# Patient Record
Sex: MALE | Race: BLACK OR AFRICAN AMERICAN | NOT HISPANIC OR LATINO | Employment: FULL TIME | URBAN - METROPOLITAN AREA
[De-identification: names, ages, dates, MRNs, and addresses within clinical notes are randomized per-mention and may not be internally consistent; named-entity substitution may affect disease eponyms.]

---

## 2020-07-19 ENCOUNTER — HOSPITAL ENCOUNTER (EMERGENCY)
Facility: HOSPITAL | Age: 60
Discharge: HOME | End: 2020-07-19
Attending: EMERGENCY MEDICINE | Admitting: EMERGENCY MEDICINE
Payer: COMMERCIAL

## 2020-07-19 VITALS
RESPIRATION RATE: 18 BRPM | HEART RATE: 54 BPM | WEIGHT: 258 LBS | OXYGEN SATURATION: 97 % | DIASTOLIC BLOOD PRESSURE: 84 MMHG | HEIGHT: 74 IN | SYSTOLIC BLOOD PRESSURE: 154 MMHG | TEMPERATURE: 97.6 F | BODY MASS INDEX: 33.11 KG/M2

## 2020-07-19 DIAGNOSIS — I10 HYPERTENSION: Primary | ICD-10-CM

## 2020-07-19 LAB
ALBUMIN SERPL BCP-MCNC: 3.6 G/DL (ref 3.5–5)
ALP SERPL-CCNC: 84 U/L (ref 46–116)
ALT SERPL W P-5'-P-CCNC: 34 U/L (ref 12–78)
ANION GAP SERPL CALCULATED.3IONS-SCNC: 7 MMOL/L (ref 4–13)
AST SERPL W P-5'-P-CCNC: 23 U/L (ref 5–45)
ATRIAL RATE: 54 BPM
ATRIAL RATE: 58 BPM
BASOPHILS # BLD AUTO: 0.04 THOUSANDS/ΜL (ref 0–0.1)
BASOPHILS NFR BLD AUTO: 1 % (ref 0–1)
BILIRUB DIRECT SERPL-MCNC: 0.23 MG/DL (ref 0–0.2)
BILIRUB SERPL-MCNC: 1.1 MG/DL (ref 0.2–1)
BUN SERPL-MCNC: 16 MG/DL (ref 5–25)
CALCIUM SERPL-MCNC: 8.8 MG/DL (ref 8.3–10.1)
CHLORIDE SERPL-SCNC: 103 MMOL/L (ref 100–108)
CO2 SERPL-SCNC: 27 MMOL/L (ref 21–32)
CREAT SERPL-MCNC: 1.2 MG/DL (ref 0.6–1.3)
EOSINOPHIL # BLD AUTO: 0.3 THOUSAND/ΜL (ref 0–0.61)
EOSINOPHIL NFR BLD AUTO: 4 % (ref 0–6)
ERYTHROCYTE [DISTWIDTH] IN BLOOD BY AUTOMATED COUNT: 13 % (ref 11.6–15.1)
GFR SERPL CREATININE-BSD FRML MDRD: 77 ML/MIN/1.73SQ M
GLUCOSE SERPL-MCNC: 100 MG/DL (ref 65–140)
HCT VFR BLD AUTO: 48.1 % (ref 36.5–49.3)
HGB BLD-MCNC: 15.3 G/DL (ref 12–17)
IMM GRANULOCYTES # BLD AUTO: 0.01 THOUSAND/UL (ref 0–0.2)
IMM GRANULOCYTES NFR BLD AUTO: 0 % (ref 0–2)
LYMPHOCYTES # BLD AUTO: 2.52 THOUSANDS/ΜL (ref 0.6–4.47)
LYMPHOCYTES NFR BLD AUTO: 36 % (ref 14–44)
MCH RBC QN AUTO: 28.5 PG (ref 26.8–34.3)
MCHC RBC AUTO-ENTMCNC: 31.8 G/DL (ref 31.4–37.4)
MCV RBC AUTO: 90 FL (ref 82–98)
MONOCYTES # BLD AUTO: 0.62 THOUSAND/ΜL (ref 0.17–1.22)
MONOCYTES NFR BLD AUTO: 9 % (ref 4–12)
NEUTROPHILS # BLD AUTO: 3.43 THOUSANDS/ΜL (ref 1.85–7.62)
NEUTS SEG NFR BLD AUTO: 50 % (ref 43–75)
NRBC BLD AUTO-RTO: 0 /100 WBCS
P AXIS: 52 DEGREES
P AXIS: 60 DEGREES
PLATELET # BLD AUTO: 187 THOUSANDS/UL (ref 149–390)
PMV BLD AUTO: 12.3 FL (ref 8.9–12.7)
POTASSIUM SERPL-SCNC: 3.5 MMOL/L (ref 3.5–5.3)
PR INTERVAL: 162 MS
PR INTERVAL: 162 MS
PROT SERPL-MCNC: 7.5 G/DL (ref 6.4–8.2)
QRS AXIS: -41 DEGREES
QRS AXIS: -44 DEGREES
QRSD INTERVAL: 108 MS
QRSD INTERVAL: 112 MS
QT INTERVAL: 390 MS
QT INTERVAL: 416 MS
QTC INTERVAL: 382 MS
QTC INTERVAL: 394 MS
RBC # BLD AUTO: 5.36 MILLION/UL (ref 3.88–5.62)
SARS-COV-2 RNA RESP QL NAA+PROBE: NEGATIVE
SODIUM SERPL-SCNC: 137 MMOL/L (ref 136–145)
T WAVE AXIS: 19 DEGREES
T WAVE AXIS: 42 DEGREES
TROPONIN I SERPL-MCNC: 0.04 NG/ML
TROPONIN I SERPL-MCNC: 0.04 NG/ML
VENTRICULAR RATE: 54 BPM
VENTRICULAR RATE: 58 BPM
WBC # BLD AUTO: 6.92 THOUSAND/UL (ref 4.31–10.16)

## 2020-07-19 PROCEDURE — 99285 EMERGENCY DEPT VISIT HI MDM: CPT | Performed by: PHYSICIAN ASSISTANT

## 2020-07-19 PROCEDURE — 80076 HEPATIC FUNCTION PANEL: CPT | Performed by: PHYSICIAN ASSISTANT

## 2020-07-19 PROCEDURE — 80048 BASIC METABOLIC PNL TOTAL CA: CPT | Performed by: PHYSICIAN ASSISTANT

## 2020-07-19 PROCEDURE — 96361 HYDRATE IV INFUSION ADD-ON: CPT

## 2020-07-19 PROCEDURE — 84484 ASSAY OF TROPONIN QUANT: CPT | Performed by: PHYSICIAN ASSISTANT

## 2020-07-19 PROCEDURE — 96360 HYDRATION IV INFUSION INIT: CPT

## 2020-07-19 PROCEDURE — 93010 ELECTROCARDIOGRAM REPORT: CPT | Performed by: INTERNAL MEDICINE

## 2020-07-19 PROCEDURE — 87635 SARS-COV-2 COVID-19 AMP PRB: CPT | Performed by: PHYSICIAN ASSISTANT

## 2020-07-19 PROCEDURE — 93005 ELECTROCARDIOGRAM TRACING: CPT

## 2020-07-19 PROCEDURE — 36415 COLL VENOUS BLD VENIPUNCTURE: CPT | Performed by: PHYSICIAN ASSISTANT

## 2020-07-19 PROCEDURE — 99284 EMERGENCY DEPT VISIT MOD MDM: CPT

## 2020-07-19 PROCEDURE — 85025 COMPLETE CBC W/AUTO DIFF WBC: CPT | Performed by: PHYSICIAN ASSISTANT

## 2020-07-19 RX ADMIN — SODIUM CHLORIDE 1000 ML: 0.9 INJECTION, SOLUTION INTRAVENOUS at 10:51

## 2020-07-19 NOTE — ED PROVIDER NOTES
History  Chief Complaint   Patient presents with    Hypertension     follows bp at work, went to nurse today due to dizziness and sob, found to be hypertensive      Denita Paris is a 64 y o  male w PMH HTN who presents for evaluation of high blood pressure  Patient gets his blood pressure checked every day at work  He reports yesterday it was elevated at 140 6/104 and he was sent home  He does report feeling very mildly dizzy and very mild shortness of breath  Has no chest pain, tightness, trouble breathing or cough  No fevers or chills  He reports about 10 people at his job of coronavirus at of about 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 employees  He denies nausea, vomiting, diarrhea or constipation  No headaches  No blurred vision  None       Past Medical History:   Diagnosis Date    Hypertension        No past surgical history on file  No family history on file  I have reviewed and agree with the history as documented  E-Cigarette/Vaping     E-Cigarette/Vaping Substances     Social History     Tobacco Use    Smoking status: Not on file   Substance Use Topics    Alcohol use: Not on file    Drug use: Not on file       Review of Systems   Constitutional: Negative for activity change, chills, diaphoresis, fatigue and fever  HENT: Negative for congestion and rhinorrhea  Eyes: Negative for pain  Respiratory: Positive for shortness of breath  Negative for cough, chest tightness and wheezing  Cardiovascular: Negative for chest pain and palpitations  Gastrointestinal: Negative for abdominal distention, constipation, diarrhea, nausea and vomiting  Genitourinary: Negative for difficulty urinating and dysuria  Musculoskeletal: Negative for arthralgias and myalgias  Neurological: Positive for dizziness  Negative for weakness, light-headedness and headaches  Psychiatric/Behavioral: The patient is not nervous/anxious          Physical Exam  Physical Exam   Constitutional: He is oriented to person, place, and time  He appears well-developed and well-nourished  No distress  HENT:   Head: Normocephalic and atraumatic  Eyes: Pupils are equal, round, and reactive to light  Neck: Normal range of motion  Neck supple  No tracheal deviation present  Cardiovascular: Normal rate, regular rhythm, normal heart sounds and intact distal pulses  Exam reveals no gallop and no friction rub  No murmur heard  Pulmonary/Chest: Effort normal and breath sounds normal  No respiratory distress  He has no wheezes  He has no rales  Abdominal: Soft  Bowel sounds are normal  He exhibits no distension and no mass  There is no tenderness  There is no guarding  Musculoskeletal: He exhibits no edema or deformity  Neurological: He is alert and oriented to person, place, and time  GCS 15, nonfocal exam, normal motor and sensory function, clear fluent speech   Skin: Skin is warm and dry  He is not diaphoretic  Psychiatric: He has a normal mood and affect  His behavior is normal    Nursing note and vitals reviewed        Vital Signs  ED Triage Vitals [07/19/20 1034]   Temperature Pulse Respirations Blood Pressure SpO2   97 6 °F (36 4 °C) 62 18 (!) 171/99 96 %      Temp Source Heart Rate Source Patient Position - Orthostatic VS BP Location FiO2 (%)   Oral Monitor Lying Right arm --      Pain Score       --           Vitals:    07/19/20 1034 07/19/20 1130 07/19/20 1342 07/19/20 1345   BP: (!) 171/99 148/91  154/84   Pulse: 62 60 (!) 54    Patient Position - Orthostatic VS: Lying   Sitting         Visual Acuity      ED Medications  Medications   sodium chloride 0 9 % bolus 1,000 mL (0 mL Intravenous Stopped 7/19/20 1331)       Diagnostic Studies  Results Reviewed     Procedure Component Value Units Date/Time    Troponin I [[id number]]  (Normal) Collected:  07/19/20 1345    Lab Status:  Final result Specimen:  Blood from Arm, Right Updated:  07/19/20 1410     Troponin I 0 04 ng/mL     [facility]ovid[facility][id number]] (Normal) Collected:  07/19/20 1047    Lab Status:  Final result Specimen:  Nares from Nose Updated:  07/19/20 1158     SARS-CoV-2 Negative    Narrative: The specimen collection materials, transport medium, and/or testing methodology utilized in the production of these test results have been proven to be reliable in a limited validation with an abbreviated program under the Emergency Utilization Authorization provided by the FDA  Testing reported as "Presumptive positive" will be confirmed with secondary testing with a reference laboratory to ensure result accuracy  Clinical caution and judgement should be used with the interpretation of these results with consideration of the clinical impression and other laboratory testing  Testing reported as "Positive" or "Negative" has been proven to be accurate according to standard laboratory validation requirements  All testing is performed with control materials showing appropriate reactivity at standard intervals        Troponin I [[id number]]  (Normal) Collected:  07/19/20 1047    Lab Status:  Final result Specimen:  Blood from Arm, Left Updated:  07/19/20 1130     Troponin I 0 04 ng/mL     Basic metabolic panel [[id number]] Collected:  07/19/20 1047    Lab Status:  Final result Specimen:  Blood from Arm, Left Updated:  07/19/20 1129     Sodium 137 mmol/L      Potassium 3 5 mmol/L      Chloride 103 mmol/L      CO2 27 mmol/L      ANION GAP 7 mmol/L      BUN 16 mg/dL      Creatinine 1 20 mg/dL      Glucose 100 mg/dL      Calcium 8 8 mg/dL      eGFR 77 ml/min/1 73sq m     Narrative:       [place] guidelines for Chronic Kidney Disease (CKD):     Stage 1 with normal or high GFR (GFR > 90 mL/min/1 73 square meters)    Stage 2 Mild CKD (GFR = 60-89 mL/min/1 73 square meters)    Stage 3A Moderate CKD (GFR = 45-59 mL/min/1 73 square meters)    Stage 3B Moderate CKD (GFR = 30-44 mL/min/1 73 square meters)    Stage 4 Severe CKD (GFR = 15-29 mL/min/1 73 square meters)    Stage 5 End Stage CKD (GFR <15 mL/min/1 73 square meters)  Note: GFR calculation is accurate only with a steady state creatinine    Hepatic function panel [[id number]]  (Abnormal) Collected:  07/19/20 1047    Lab Status:  Final result Specimen:  Blood from Arm, Left Updated:  07/19/20 1129     Total Bilirubin 1 10 mg/dL      Bilirubin, Direct 0 23 mg/dL      Alkaline Phosphatase 84 U/L      AST 23 U/L      ALT 34 U/L      Total Protein 7 5 g/dL      Albumin 3 6 g/dL     CBC and differential [[id number]] Collected:  07/19/20 1047    Lab Status:  Final result Specimen:  Blood from Arm, Left Updated:  07/19/20 1059     WBC 6 92 Thousand/uL      RBC 5 36 Million/uL      Hemoglobin 15 3 g/dL      Hematocrit 48 1 %      MCV 90 fL      MCH 28 5 pg      MCHC 31 8 g/dL      RDW 13 0 %      MPV 12 3 fL      Platelets 120 Thousands/uL      nRBC 0 /100 WBCs      Neutrophils Relative 50 %      Immat GRANS % 0 %      Lymphocytes Relative 36 %      Monocytes Relative 9 %      Eosinophils Relative 4 %      Basophils Relative 1 %      Neutrophils Absolute 3 43 Thousands/µL      Immature Grans Absolute 0 01 Thousand/uL      Lymphocytes Absolute 2 52 Thousands/µL      Monocytes Absolute 0 62 Thousand/µL      Eosinophils Absolute 0 30 Thousand/µL      Basophils Absolute 0 04 Thousands/µL                  No orders to display              Procedures  Procedures         ED Course  ED Course as of Jul 19 1604   Sun Jul 19, 2020   1125 EKG Interpretation    Rate: 58 BPM  Rhythm: sinus dyana  Axis: LAD  Intervals: Normal, no blocks, QTc 382ms  Q waves: no  T waves: flattened in III   ST segments: no dep / elevation    Impression: abnormal EKG  EKG for comparison: no prior   EKG interpreted by me  US AUDIT      Most Recent Value   Initial Alcohol Screen: US AUDIT-C    1  How often do you have a drink containing alcohol? 5 Filed at: 07/19/2020 1131   2   How many drinks containing alcohol do you have on a typical day you are drinking? 2 Filed at: 07/19/2020 1131   3a  Male UNDER 65: How often do you have five or more drinks on one occasion? 0 Filed at: 07/19/2020 1131   3b  FEMALE Any Age, or MALE 65+: How often do you have 4 or more drinks on one occassion? 0 Filed at: 07/19/2020 1131   Audit-C Score  7 Filed at: 07/19/2020 1131   Full Alcohol Screen: US AUDIT   4  How often during the last year have you found that you were not able to stop drinking once you had started? 0 Filed at: 07/19/2020 1131   5  How often during past year have you failed to do what was normally expected of you because of drinking? 0 Filed at: 07/19/2020 1131   6  How often in past year have you needed a first drink in the morning to get yourself going after a heavy drinking session? 0 Filed at: 07/19/2020 1131   7  How often in past year have you had feeling of guilt or remorse after drinking? 0 Filed at: 07/19/2020 1131   8  How often in past year have you been unable to remember what happened night before because you had been drinking? 0 Filed at: 07/19/2020 1131   9  Have you or someone else been injured as a result of your drinking? 0 Filed at: 07/19/2020 1131   10  Has a relative, friend, doctor or other health worker been concerned about your drinking and suggested you cut down?   0 Filed at: 07/19/2020 1131   AUDIT Total Score  7 Filed at: 07/19/2020 1131            HEART Risk Score      Most Recent Value   Heart Score Risk Calculator   History  0 Filed at: 07/19/2020 1417   ECG  1 Filed at: 07/19/2020 1417   Age  1 Filed at: 07/19/2020 1417   Risk Factors  1 Filed at: 07/19/2020 1417   Troponin  0 Filed at: 07/19/2020 1417   HEART Score  3 Filed at: 07/19/2020 1417            GAURAV/DAST-10      Most Recent Value   How many times in the past year have you    Used an illegal drug or used a prescription medication for non-medical reasons?   Never Filed at: 07/19/2020 1033                                MDM  Number of Diagnoses or Management Options  Hypertension:   Diagnosis management comments: DDX includes but not ltd to:   Patient presents with hypertension  Clinically he does not show any signs of end-organ dysfunction  Her every does have mild shortness of breath and dizziness we will check some basic labs  Will check an EKG although is no chest pain or tightness  Will also check coronavirus test given his shortness of breath and some place at work of this  Plan is to obtain:  CBC to check for anemia, leukocytosis, hydration status  Chemistry panel to check for lyte abnormalities, organ function   EKG/trop to check for ischemic changes     Based on results:  Patient [facility] trop at [facility]  Had a serial trop that was stable  Repeat EKG also unchanged  D/c to home  Discussed lifestyle modifications and also advised increasing the HCTZ to 1 5 pills per day until he can get in with his family doctor  Return parameters discussed  Pt requires f/u as an outpt  Pt expresses understanding w above treatment plan  All questions answered prior to d/c  Disposition  Final diagnoses:   Hypertension     Time reflects when diagnosis was documented in both MDM as applicable and the Disposition within this note     Time User Action Codes Description Comment    7/19/2020  2:32 PM Cayla Crouch 40  Hypertension       ED Disposition     ED Disposition Condition Date/Time Comment    Discharge Stable [place] Jul 19, 2020  2:32 PM Karen Johnson discharge to home/self care              Follow-up Information     Follow up With Specialties Details Why Contact Info Additional Information    [facility] Emergency Department Emergency Medicine  If symptoms worsen [address] [facility] [address]  [address] [place] [address] ED, [address] [place], [place], [address]    Candelario Vigil MD  Call in 1 day  [address] [address] [facility] [address]  [phone]             There are no discharge medications for this patient  No discharge procedures on file      PDMP Review     None          ED Provider  Electronically Signed by           Leandra Whitten PA-C  07/19/20 3030

## 2020-07-19 NOTE — ED NOTES
Patient refusing vitals  Patient states "I didn't know it was going to take this long, nobody ever told me the plan  What's going on? I want to leave " Patient updated on care plan, again  Patient wife at bedside requesting to speak with physician, provider aware       Raudel Dempsey RN  07/19/20 0168

## 2020-07-19 NOTE — ED NOTES
Patient updated on care plan at this time  Patient persistent on time line upon discharge, clearly agitated  Patient informed we are waiting for results from second EKG and second troponin        Luc Daugherty RN  07/19/20 0243

## 2022-03-02 ENCOUNTER — HOSPITAL ENCOUNTER (EMERGENCY)
Facility: HOSPITAL | Age: 62
Discharge: HOME | End: 2022-03-02
Attending: EMERGENCY MEDICINE | Admitting: EMERGENCY MEDICINE
Payer: COMMERCIAL

## 2022-03-02 ENCOUNTER — APPOINTMENT (EMERGENCY)
Dept: RADIOLOGY | Facility: HOSPITAL | Age: 62
End: 2022-03-02
Payer: COMMERCIAL

## 2022-03-02 VITALS
RESPIRATION RATE: 18 BRPM | OXYGEN SATURATION: 97 % | WEIGHT: 257 LBS | DIASTOLIC BLOOD PRESSURE: 86 MMHG | SYSTOLIC BLOOD PRESSURE: 156 MMHG | HEART RATE: 83 BPM | BODY MASS INDEX: 32.98 KG/M2 | HEIGHT: 74 IN | TEMPERATURE: 97.9 F

## 2022-03-02 DIAGNOSIS — M10.9 GOUT: Primary | ICD-10-CM

## 2022-03-02 PROCEDURE — 99283 EMERGENCY DEPT VISIT LOW MDM: CPT

## 2022-03-02 PROCEDURE — 73660 X-RAY EXAM OF TOE(S): CPT

## 2022-03-02 PROCEDURE — 99284 EMERGENCY DEPT VISIT MOD MDM: CPT | Performed by: PHYSICIAN ASSISTANT

## 2022-03-02 RX ORDER — INDOMETHACIN 50 MG/1
50 CAPSULE ORAL
Qty: 21 CAPSULE | Refills: 0 | Status: SHIPPED | OUTPATIENT
Start: 2022-03-02 | End: 2022-03-09

## 2022-03-02 RX ORDER — COLCHICINE 0.6 MG/1
0.6 TABLET ORAL ONCE
Status: COMPLETED | OUTPATIENT
Start: 2022-03-02 | End: 2022-03-02

## 2022-03-02 RX ORDER — COLCHICINE 0.6 MG/1
1.2 TABLET ORAL ONCE
Status: COMPLETED | OUTPATIENT
Start: 2022-03-02 | End: 2022-03-02

## 2022-03-02 RX ORDER — OXYCODONE HYDROCHLORIDE AND ACETAMINOPHEN 5; 325 MG/1; MG/1
1 TABLET ORAL EVERY 6 HOURS PRN
Qty: 12 TABLET | Refills: 0 | Status: SHIPPED | OUTPATIENT
Start: 2022-03-02 | End: 2022-03-05

## 2022-03-02 RX ADMIN — COLCHICINE 1.2 MG: 0.6 TABLET ORAL at 15:44

## 2022-03-02 RX ADMIN — COLCHICINE 0.6 MG: 0.6 TABLET ORAL at 15:45

## 2022-03-02 NOTE — ED PROVIDER NOTES
History  Chief Complaint   Patient presents with    Foot Swelling     right foot swelling since this AM  had similar episode 3 months ago with pain near great toe  put icy hot on toe with some relief     63 y o  male with past medical history significant for hypertension presents to ED with chief complaint of right great toe pain and swelling  Onset of symptoms reported as sudden, this morning  Location of symptoms reported as right great toe  Quality is reported as localized throbbing paid, redness, swelling  Severity is reported as moderate  Associated symptoms: denies paralysis paresthesia or weakness to the right lower extremity  Denies right ankle pain or swelling  Denies right knee pain  Modifying factors:  Walking and weight-bearing exacerbates pain  Tried ice with minimal relief of pain    Context:  Denies acute fall injury or trauma  States pain started spontaneously early this morning  He does report that 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  He takes blood pressure medication, and has been drinking alcohol and eating red meat recently  He reports a similar episode approximately 3 months ago that did resolve when he improved his dietary intake  Reviewed past medical history and visits via EPIC: patient last seen in ed on 7/19/20 for evaluation of hypertension           History provided by:  Patient and spouse   used: No        None       Past Medical History:   Diagnosis Date    Hypertension        History reviewed  No pertinent surgical history  History reviewed  No pertinent family history  I have reviewed and agree with the history as documented      E-Cigarette/Vaping     E-Cigarette/Vaping Substances     Social History     Tobacco Use    Smoking status: Never Smoker    Smokeless tobacco: Never Used   Substance Use Topics    Alcohol use: Not on file    Drug use: Not on file       Review of Systems   Constitutional: Negative for chills, fatigue and fever  Cardiovascular: Negative for leg swelling  Gastrointestinal: Negative for nausea and vomiting  Musculoskeletal: Positive for arthralgias and joint swelling  Negative for back pain, gait problem, myalgias, neck pain and neck stiffness  Skin: Positive for color change  Neurological: Negative for tremors, weakness and numbness  All other systems reviewed and are negative  Physical Exam  Physical Exam  Vitals and nursing note reviewed  Constitutional:       General: He is not in acute distress  Appearance: Normal appearance  Comments: /86 (BP Location: Left arm)   Pulse 83   Temp 97 9 °F (36 6 °C) (Tympanic)   Resp 18   Ht 6' 2" (1 88 m)   Wt 117 kg (257 lb)   SpO2 97%   BMI 33 00 kg/m²      HENT:      Head: Normocephalic and atraumatic  Right Ear: External ear normal       Left Ear: External ear normal       Nose: Nose normal    Eyes:      General: No scleral icterus  Right eye: No discharge  Left eye: No discharge  Extraocular Movements: Extraocular movements intact  Conjunctiva/sclera: Conjunctivae normal    Cardiovascular:      Rate and Rhythm: Normal rate and regular rhythm  Pulses: Normal pulses  Pulmonary:      Effort: Pulmonary effort is normal  No respiratory distress  Musculoskeletal:         General: Swelling and tenderness present  No deformity or signs of injury  Normal range of motion  Cervical back: Normal range of motion and neck supple  No rigidity or tenderness  Comments: Right foot exam:  There is generalized redness and swelling isolated to right 1st MTPJ appears consistent with gout  No 5th metatarsal tenderness  The remainder of right foot exam:  No open fracture  Compartments soft  Capillary refills less than 2sec  +2 pulse  Normal sensation  Nerves and tendons intact  No tenderness to palpation distal to shoulder  No retained foreign body or signs of infection       Skin:     Capillary Refill: Capillary refill takes less than 2 seconds  Coloration: Skin is not jaundiced or pale  Findings: No erythema or rash  Neurological:      General: No focal deficit present  Mental Status: He is alert and oriented to person, place, and time  Mental status is at baseline  Cranial Nerves: No cranial nerve deficit  Sensory: No sensory deficit  Motor: No weakness  Gait: Gait normal    Psychiatric:         Mood and Affect: Mood normal          Behavior: Behavior normal          Thought Content: Thought content normal          Judgment: Judgment normal          Vital Signs  ED Triage Vitals [03/02/22 1417]   Temperature Pulse Respirations Blood Pressure SpO2   97 9 °F (36 6 °C) 83 18 156/86 97 %      Temp Source Heart Rate Source Patient Position - Orthostatic VS BP Location FiO2 (%)   Tympanic Monitor Sitting Left arm --      Pain Score       --           Vitals:    03/02/22 1417   BP: 156/86   Pulse: 83   Patient Position - Orthostatic VS: Sitting         Visual Acuity      ED Medications  Medications   colchicine (COLCRYS) tablet 1 2 mg (1 2 mg Oral Given 3/2/22 1544)   colchicine (COLCRYS) tablet 0 6 mg (0 6 mg Oral Given by Other 3/2/22 1545)       Diagnostic Studies  Results Reviewed     None                 XR toe great min 2 view RIGHT    (Results Pending)              Procedures  Procedures         ED Course                                             MDM  Number of Diagnoses or Management Options  Gout: new and requires workup  Diagnosis management comments: [facility]  ED Summary: 63year old male presents with atraumatic right great toe redness/pain/swelling starting this morning  Multiple risk factors for gout including alcohol/red meat consumption/repetative flexion of great toe at work/use of blood pressure medication with diuretic    DDX:  Differential diagnosis includes but is not limited to gout, pseudogout cellulitis, fracture, osteoarthritis, rheumatoid arthritis  Initial ED Plan:  Check right great toe x-ray    ED Results:  Reviewed at bedside:  Right great toe x-ray images independently visualized interpreted by me  No acute fracture or dislocation  ED Coarse:  Atraumatic right great toe swelling in patient with multiple risk factors for gout  Exam and clinical findings consistent with gout  X-ray negative for fracture  Discussed diagnosis and treatment plan  Colchicine 1 2 mg p o  X1 here in emergency department  Repeat 0 6 mg p o  X1 in 1 hour if pain still present  And indomethacin  Instructed take with food  Instructed avoid alcohol, seafood, red meats and wine  Encourage drinking water  Add percocet for severe pain  Standard narcotic precautions given  F/u pcp 2-3 days - advised may want to discussed with PCP changing BP meds to eliminate diuretic if routine gout flare ups occur  F/u podiatry in 1-2 weeks if not improved  Return to ED precautions given               Amount and/or Complexity of Data Reviewed  Tests in the radiology section of CPT®: ordered and reviewed  Discussion of test results with the performing providers: yes  Obtain history from someone other than the patient: yes (spouse)  Review and summarize past medical records: yes    Risk of Complications, Morbidity, and/or Mortality  General comments: Disclaimers:    I have reasonably determine that electronically prescribing a controlled substance would be impractical for the patient to obtain the controlled substance prescribed by electronic prescription or would cause an untimely delay resulting in an adverse impact on the patient's medical condition        Patient was seen during the outbreak of the corona virus epidemic   Resources are limited due to the severity of patient illnesses associated with virus   Testing is also limited at this time   Discussed with patient at the time of this evaluation   Due to the fact that limited resources are available -treatment options are limited  Patient Progress  Patient progress: stable      Disposition  Final diagnoses:   Gout     Time reflects when diagnosis was documented in both MDM as applicable and the Disposition within this note     Time User Action Codes Description Comment    3/2/2022  3:40 PM Tressa Lara Add [M10 9] Gout       ED Disposition     ED Disposition Condition Date/Time Comment    Discharge Stable Wed Mar 2, 2022  3:42 PM Odin Ch discharge to home/self care  Follow-up Information     Follow up With Specialties Details Why Contact Info Additional Information    Sofia De Oliveira MD  Call in 2 days for further evaluation of symptoms [address] [facility] [address] [facility]       [address] [facility] Emergency Department Emergency Medicine Go to  If symptoms worsen 34 [facility] 109 [facility] Emergency Department, [address] [place] [address], [facility] Pod[facility]ry Call in 1 week for further evaluation of symptoms [address]  [address] [address]  [phone]             Patient's Medications   Discharge Prescriptions    INDOMETHACIN (INDOCIN) 50 MG CAPSULE    Take 1 capsule (50 mg total) by mouth 3 (three) times a day with meals for 7 days       Start Date: 3/2/2022  End Date: 3/9/2022       Order Dose: 50 mg       Quantity: 21 capsule    Refills: 0    OXYCODONE-ACETAMINOPHEN (PERCOCET) 5-325 MG PER TABLET    Take 1 tablet by mouth every 6 (six) hours as needed for severe pain (toe pain/initial rx ) for up to 3 days Label no driving no etoh  Initial rx  Dx: Max Daily Amount: 4 tablets       Start Date: 3/2/2022  End Date: 3/5/2022       Order Dose: 1 tablet       Quantity: 12 tablet    Refills: 0       No discharge procedures on file      PDMP Review     None          ED Provider  Electronically Signed by           Leatha Kate PA-C  03/02/22 5368